# Patient Record
Sex: FEMALE | Race: WHITE
[De-identification: names, ages, dates, MRNs, and addresses within clinical notes are randomized per-mention and may not be internally consistent; named-entity substitution may affect disease eponyms.]

---

## 2022-10-31 ENCOUNTER — HOSPITAL ENCOUNTER (OUTPATIENT)
Dept: HOSPITAL 88 - PT | Age: 82
End: 2022-10-31
Attending: SPECIALIST
Payer: MEDICARE

## 2022-10-31 DIAGNOSIS — S46.001A: Primary | ICD-10-CM

## 2022-11-29 ENCOUNTER — HOSPITAL ENCOUNTER (OUTPATIENT)
Dept: HOSPITAL 88 - PT | Age: 82
LOS: 1 days | End: 2022-11-30
Attending: SPECIALIST
Payer: MEDICARE

## 2022-11-29 DIAGNOSIS — M75.101: Primary | ICD-10-CM

## 2023-01-27 ENCOUNTER — HOSPITAL ENCOUNTER (OUTPATIENT)
Dept: HOSPITAL 88 - PT | Age: 83
LOS: 4 days | End: 2023-01-31
Attending: SPECIALIST
Payer: MEDICARE

## 2023-01-27 DIAGNOSIS — M75.101: Primary | ICD-10-CM

## 2023-02-17 ENCOUNTER — HOSPITAL ENCOUNTER (OUTPATIENT)
Dept: HOSPITAL 88 - PT | Age: 83
LOS: 11 days | End: 2023-02-28
Attending: SPECIALIST
Payer: MEDICARE

## 2023-02-17 DIAGNOSIS — M75.101: Primary | ICD-10-CM

## 2025-04-25 ENCOUNTER — APPOINTMENT (OUTPATIENT)
Dept: URBAN - METROPOLITAN AREA CLINIC 154 | Facility: CLINIC | Age: 85
Setting detail: DERMATOLOGY
End: 2025-04-25

## 2025-04-25 DIAGNOSIS — D69.2 OTHER NONTHROMBOCYTOPENIC PURPURA: ICD-10-CM

## 2025-04-25 DIAGNOSIS — L50.8 OTHER URTICARIA: ICD-10-CM

## 2025-04-25 PROBLEM — L30.9 DERMATITIS, UNSPECIFIED: Status: ACTIVE | Noted: 2025-04-25

## 2025-04-25 PROCEDURE — ? PRESCRIPTION MEDICATION MANAGEMENT

## 2025-04-25 PROCEDURE — 99203 OFFICE O/P NEW LOW 30 MIN: CPT

## 2025-04-25 PROCEDURE — ? COUNSELING

## 2025-04-25 PROCEDURE — ? PRESCRIPTION

## 2025-04-25 RX ORDER — TRIAMCINOLONE ACETONIDE 1 MG/G
OINTMENT TOPICAL
Qty: 80 | Refills: 0 | Status: ERX | COMMUNITY
Start: 2025-04-25

## 2025-04-25 RX ADMIN — TRIAMCINOLONE ACETONIDE: 1 OINTMENT TOPICAL at 00:00

## 2025-04-25 ASSESSMENT — LOCATION SIMPLE DESCRIPTION DERM: LOCATION SIMPLE: RIGHT POSTERIOR UPPER ARM

## 2025-04-25 ASSESSMENT — LOCATION DETAILED DESCRIPTION DERM: LOCATION DETAILED: RIGHT DISTAL POSTERIOR UPPER ARM

## 2025-04-25 ASSESSMENT — LOCATION ZONE DERM: LOCATION ZONE: ARM

## 2025-04-25 NOTE — PROCEDURE: PRESCRIPTION MEDICATION MANAGEMENT
Initiate Treatment: triamcinolone acetonide 0.1 % topical ointment \\nQuantity: 80.0 g  Days Supply: 30\\nSig: Apply qd-bid to affected areas on body prn itching\\nC/t not use on thin skin and bruised areas
Detail Level: Zone
Plan: Discussed insect bites as a likely cause. Suggest getting checked out for bed bugs
Render In Strict Bullet Format?: No